# Patient Record
(demographics unavailable — no encounter records)

---

## 2018-08-05 NOTE — EDM.PDOC
ED HPI GENERAL MEDICAL PROBLEM





- General


Chief Complaint: Laceration


Stated Complaint: POSSIBLE STITCHES NEEDED ON HAND [id number]


Time Seen by Provider: 08/05/18 23:49


Source of Information: Reports: Patient


History Limitations: Reports: No Limitations





- History of Present Illness


INITIAL COMMENTS - FREE TEXT/NARRATIVE: 





cut left palm with knife while at work 1.5 cm superficial flab laceration base 

of palm 830 tonight 





  ** Left Hand


Pain Score (Numeric/FACES): 3





Past Medical History





- Past Health History


Medical/Surgical History: Denies Medical/Surgical History





Social & Family History





- Tobacco Use


Smoking Status *Q: Light Tobacco Smoker


Years of Tobacco use: 1


Packs/Tins Daily: 0.5





- Recreational Drug Use


Recreational Drug Use: No





ED ROS GENERAL





- Review of Systems


Review Of Systems: ROS reveals no pertinent complaints other than HPI.





ED EXAM, SKIN/RASH


Exam: See Below


Exam Limited By: No Limitations


General Appearance: Alert, No Apparent Distress


Eye Exam: Bilateral Eye: EOMI


Ears: Normal External Exam


Nose: Normal Inspection


Throat/Mouth: Normal Lips, Normal Voice


Head: Atraumatic, Normocephalic


Neck: Full Range of Motion


Respiratory/Chest: No Respiratory Distress


Cardiovascular: Normal Peripheral Pulses


Skin: Warm, Dry, Wound/Incision (1.5 laseration base of palm left hand no 

active bleeding).  No: Intact


Associated features: Tenderness.  No: Warmth, Swelling





ED SKIN PROCEDURES





- Laceration/Wound Repair


  ** Left Lower Dorsal Hand


Lac/Wound length In cm: 1.5 (superficial flap)


Appearance: Superficial


Distal NVT: Neuro & Vascular Intact


Skin Prep: Chlorhexidine (Hibiciens), Saline


Closed with: Wound Adhesive, Steri-Strips


Sterile Dressing Applied: Nurse


Tetanus Status Addressed: Yes


Complications: No





Course





- Vital Signs


Last Recorded V/S: 


 Last Vital Signs











Temp  98.6 F   08/05/18 23:16


 


Pulse  72   08/05/18 23:16


 


Resp  16   08/05/18 23:16


 


BP  145/69 H  08/05/18 23:16


 


Pulse Ox  97   08/05/18 23:16














Departure





- Departure


Time of Disposition: 23:57


Disposition: Home, Self-Care 01


Condition: Good


Clinical Impression: 


 Broken skin








- Discharge Information


Instructions:  Laceration Care, Adult, Easy-to-Read


Forms:  ED Department Discharge


Additional Instructions: 


keep clean and dry cover with bandaide, use gloves while at work


follow up redness drainage or swelling